# Patient Record
Sex: MALE | Race: WHITE | Employment: UNEMPLOYED | URBAN - METROPOLITAN AREA
[De-identification: names, ages, dates, MRNs, and addresses within clinical notes are randomized per-mention and may not be internally consistent; named-entity substitution may affect disease eponyms.]

---

## 2022-06-28 ENCOUNTER — TELEPHONE (OUTPATIENT)
Dept: OBGYN CLINIC | Facility: HOSPITAL | Age: 27
End: 2022-06-28

## 2022-06-28 NOTE — TELEPHONE ENCOUNTER
Hello,  Please advise if the following patient can be forced onto the schedule:    Patient: Ha Ramirez    : 1995    MRN: [id number]    Call back #: [phone]    Insurance[place]     Reason for appointment: Hand fx    Requested doctor/location: [facility]      Thank you      Email sent to [facility]

## 2022-06-30 ENCOUNTER — OFFICE VISIT (OUTPATIENT)
Dept: OBGYN CLINIC | Facility: CLINIC | Age: 27
End: 2022-06-30
Payer: COMMERCIAL

## 2022-06-30 ENCOUNTER — APPOINTMENT (OUTPATIENT)
Dept: RADIOLOGY | Facility: AMBULARY SURGERY CENTER | Age: 27
End: 2022-06-30
Attending: SURGERY

## 2022-06-30 VITALS
BODY MASS INDEX: 25.98 KG/M2 | SYSTOLIC BLOOD PRESSURE: 115 MMHG | DIASTOLIC BLOOD PRESSURE: 75 MMHG | HEIGHT: 74 IN | RESPIRATION RATE: 16 BRPM | HEART RATE: 80 BPM | WEIGHT: 202.4 LBS

## 2022-06-30 DIAGNOSIS — S62.334D: Primary | ICD-10-CM

## 2022-06-30 DIAGNOSIS — M79.641 RIGHT HAND PAIN: ICD-10-CM

## 2022-06-30 PROCEDURE — 29075 APPL CST ELBW FNGR SHORT ARM: CPT | Performed by: PHYSICIAN ASSISTANT

## 2022-06-30 PROCEDURE — 73130 X-RAY EXAM OF HAND: CPT

## 2022-06-30 PROCEDURE — 99204 OFFICE O/P NEW MOD 45 MIN: CPT | Performed by: PHYSICIAN ASSISTANT

## 2022-06-30 RX ORDER — QUETIAPINE FUMARATE 100 MG/1
1 TABLET, FILM COATED ORAL
COMMUNITY
Start: 2022-06-27

## 2022-06-30 RX ORDER — PRAZOSIN HYDROCHLORIDE 1 MG/1
9 CAPSULE ORAL EVERY EVENING
COMMUNITY
Start: 2022-06-27

## 2022-06-30 RX ORDER — DIVALPROEX SODIUM 500 MG/1
1 TABLET, EXTENDED RELEASE ORAL 3 TIMES DAILY
COMMUNITY
Start: 2022-06-27

## 2022-06-30 NOTE — PROGRESS NOTES
Reagan REYNOLDS  Attending, Orthopaedic Surgery  Hand, Wrist, and Elbow Surgery  [facility]      ORTHOPAEDIC HAND, WRIST, AND ELBOW OFFICE  VISIT       ASSESSMENT/PLAN:      32 yo male with 4th  neck fracture and old 11th  neck fracture, DOI 6/19/22  Xrays reviewed  Fracture at the 4th is relatively nondisplaced and amenable to nonoperative care  He was placed in a cast today for the next 2 5 weeks, plan to transition to splint at next visit  The patient verbalized understanding of exam findings and treatment plan  We engaged in the shared decision-making process and treatment options were discussed at length with the patient  Surgical and conservative management discussed today along with risks and benefits  Diagnoses and all orders for this visit:    Closed displaced fracture of neck of fourth metacarpal bone of right hand with routine healing  -     Cast application    Right hand pain  -     XR hand 3+ vw right; Future    Other orders  -     divalproex sodium (DEPAKOTE ER) 500 mg 24 hr tablet; Take 1 tablet by mouth 3 (three) times a day  -     QUEtiapine (SEROquel) 100 mg tablet; Take 1 tablet by mouth daily at bedtime  -     prazosin (MINIPRESS) 1 mg capsule; Take 9 mg by mouth every evening        Follow Up:  Return in about 18 days (around 7/18/2022)  To Do Next Visit:  X-rays of the  right  hand and Cast off      General Discussions:  Fracture - Nonoperative Care: The physiology of a fractured bone was discussed with the patient today  With non-displaced or minimally displaced fractures, conservative treatment such as casting or splinting often results in a functional recovery  Typically, these fractures are immobilized in either a cast or splint depending on the pattern    Radiographs are typically taken at intervals throughout the fracture healing to ensure that reduction or alignment is not lost   If the fracture loses its alignment, surgical intervention may be required to stabilize it  Medical conditions such as diabetes, osteoporosis, vitamin D deficiency, and a history of or exposure to smoking may delay or prevent fracture healing  Options between cast/splint immobilization and surgical treatment were offered and the risks and benefits of both were discussed  ____________________________________________________________________________________________________________________________________________      CHIEF COMPLAINT:  Chief Complaint   Patient presents with    Right Hand - Pain, Fracture       SUBJECTIVE:  Arabella Jackson is a 33y o  year old RHD male who presents for evaluation of a right hand fracture  He states he punched a wall on 6/19 and was evaluated, found to have a 4th MC fracture  He does note prior 5th MC fractures bilaterally that were treated nonoperatively  Denies significant pain today or any paresthesias          Pain/symptom timing:  Worse during the day when active  Pain/symptom context:  Worse with activites and work  Pain/symptom modifying factors:  Rest makes better, activities make worse  Pain/symptom associated signs/symptoms: none    Prior treatment   · NSAIDsYes   · Injections No   · Bracing/Orthotics No    Physical Therapy No     I have personally reviewed all the relevant PMH, PSH, SH, FH, Medications and allergies      PAST MEDICAL HISTORY:  Past Medical History:   Diagnosis Date    Bipolar affective disorder (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    Drug use: Not Currently       MEDICATIONS:    Current Outpatient Medications:     divalproex sodium (DEPAKOTE ER) 500 mg 24 hr tablet, Take 1 tablet by mouth 3 (three) times a day, Disp: , Rfl:     prazosin (MINIPRESS) 1 mg capsule, Take 9 mg by mouth every evening, Disp: , Rfl:     QUEtiapine (SEROquel) 100 mg tablet, Take 1 tablet by mouth daily at bedtime, Disp: , Rfl:     ALLERGIES:  No Known Allergies        REVIEW OF SYSTEMS:  Review of Systems    VITALS:  Vitals:    06/30/22 1107   BP: 115/75   Pulse: 80   Resp: 16       LABS:  HgA1c: No results found for: HGBA1C  BMP: No results found for: GLUCOSE, CALCIUM, NA, K, CO2, CL, BUN, CREATININE    _____________________________________________________  PHYSICAL EXAMINATION:  General: well developed and well nourished, alert, oriented times 3 and appears comfortable  Psychiatric: Normal  HEENT: Normocephalic, Atraumatic Trachea Midline, No torticollis  Pulmonary: No audible wheezing or respiratory distress   Abdomen/GI: Non tender, non distended   Cardiovascular: No pitting edema, 2+ radial pulse   Skin: No masses, erythema, lacerations, fluctation, ulcerations  Neurovascular: Sensation Intact to the Median, Ulnar, Radial Nerve, Motor Intact to the Median, Ulnar, Radial Nerve and Pulses Intact  Musculoskeletal: Normal, except as noted in detailed exam and in HPI  MUSCULOSKELETAL EXAMINATION:  Right hand:   Mild edema over fracture site  Tender at 4th MC head  Slight deformity at 4th and 5th MCPs   Sensation and motion intact     ___________________________________________________  STUDIES REVIEWED:  I have personally reviewed AP lateral and oblique radiographs of right hand   which demonstrate acute 4th MC head fracture, healed old 5th MC neck fx          PROCEDURES PERFORMED:  Cast application    Date/Time: 6/30/2022 11:52 AM  Performed by: Dalton Nichols PA-C  Authorized by: Dalton Nichols PA-C   [place] Protocol:  Consent: Verbal consent obtained    Risks and benefits: risks, benefits and alternatives were discussed  Consent given by: patient  Patient understanding: patient states understanding of the procedure being performed  Site marked: the operative site was marked  Radiology Images displayed and confirmed  If images not available, report reviewed: imaging studies available  Required items: required blood products, implants, devices, and special equipment available  Patient identity confirmed: verbally with patient      Pre-procedure details:     Sensation:  Normal  Procedure details:     Laterality:  Right    Location:  Hand    Hand:  R handCast type:  Short arm      Supplies:  Cotton padding and fiberglass  Post-procedure details:     Pain:  Unchanged    Sensation:  Normal    Patient tolerance of procedure:   Tolerated well, no immediate complications           _____________________________________________________      [place] Backbone    I,:  Montserrat Chadwick PA-C am acting as a scribe while in the presence of the attending physician :       I,:  Valeria Ruiz MD personally performed the services described in this documentation    as scribed in my presence :